# Patient Record
(demographics unavailable — no encounter records)

---

## 2025-03-12 NOTE — ASSESSMENT
[FreeTextEntry1] : Xray obtained RIGHT 3 views: reveal no fracture dislocation plantar heel spurring and posterior heel spurring. dorsal osteophyte at first metatarsal  Discussed diagnosis and treatment with patient  Discussed etiology of symptoms patient is experiencing  Demonstrated proper stretching techniques with patient showing understanding  Discussed proper RICE techniques to reduce inflammation and for pain control  Discussed specific examples of over-the-counter inserts to control heel eversion and support the medial arch Discussed proper shoes (stiff heel counter and midsole with flexion at the 1st MTPJ)  Rx:  Meloxicam PO BID for 2 weeks, then as needed Discussed all risks, complications, and benefits of corticosteroid injection therapy.   Will have further discussion at next visit Skin prepped with alcohol and corticosteroid injection Right  heel with 1 cc of 0.5% Marcaine plain and 4 mg Dexamethasone Phosphate. l1cc celestone.   Patient tolerated procedure well and a dry bandage applied to Right foot  Patient to return to the office in 3-4 weeks

## 2025-03-12 NOTE — PHYSICAL EXAM
[General Appearance - Alert] : alert [General Appearance - In No Acute Distress] : in no acute distress [Ankle Swelling (On Exam)] : not present [Varicose Veins Of Lower Extremities] : not present [] : not present [Delayed in the Right Toes] : capillary refills normal in right toes [Delayed in the Left Toes] : capillary refills normal in the left toes [2+] : left foot dorsalis pedis 2+ [de-identified] : Pain on palpation at medial calcaneal tuberosity of Right   Increased pain with the windless mechanism.  Flexible mid/hindfoot deformity noted foot passively corrected to plantigrade foot position Right.  Decreased range of motion in dorsiflexion Right Left.Standing Exam: Decrease in medial longitudinal arch Right and Left.Hindfoot valgus noted Right and Left.  Forefoot abduction 'too many toes' sign Right and Left Muscle strength 5/5 all major muscle groups bilateral. [Skin Color & Pigmentation] : normal skin color and pigmentation [Sensation] : the sensory exam was normal to light touch and pinprick [Vibration Dec.] : normal vibratory sensation at the level of the toes [Oriented To Time, Place, And Person] : oriented to person, place, and time [Impaired Insight] : insight and judgment were intact

## 2025-03-12 NOTE — HISTORY OF PRESENT ILLNESS
[FreeTextEntry1] : 48 year old male presents for initial visit for right heel pain x 3 weeks. Denies any inciting incident. Admits pain at first step in morning which gets better progressively throughout the day. States pain is 8/10. Has not seen provider for this. Denies any additional complaints.

## 2025-03-26 NOTE — HISTORY OF PRESENT ILLNESS
[FreeTextEntry1] : 48 year old male presents for follow up on right heel pain. States he has slight improvement from last visit but still painful. Rates the pain as 5/10 currently. patient is doing stretching exercises at home. Patient did not finish course of Meloxicam, took 6 pills.  Received injection last visit. states it did help Most pain is first step in morning

## 2025-03-26 NOTE — ASSESSMENT
[FreeTextEntry1] : Xray obtained RIGHT previously 3 views: reveal no fracture dislocation plantar heel spurring and posterior heel spurring. dorsal osteophyte at first metatarsal  Discussed diagnosis and treatment with patient  Discussed etiology of symptoms patient is experiencing Continue  Demonstrated proper stretching techniques with patient showing understanding  Discussed proper RICE techniques to reduce inflammation and for pain control  Discussed specific examples of over-the-counter inserts to control heel eversion and support the medial arch Discussed proper shoes (stiff heel counter and midsole with flexion at the 1st MTPJ) - [employer]   The [employer] Shape 3D laser foot scanner was placed in an upright position and the height of the patients chair was adjusted so that the patients feet would be in proper alignment perpendicular to the scanner. The right foot was placed upon the scanner and held in neutral position with moderate pressure of the foot against the scanner with the plantar aspect of the foot against the scanner. Orientation of the foot to the scanner was lined up with the central line of the scanner. The foot pedal was depressed to initiate the first scan. The integrity of the image scanned was checked and the final scanner file was written to the hard drive. The same procedure was performed on the left foot. The scanned images were then forwarded to the orthotic lab via E-mail to fabricate supportive accommodative UCB-type orthotics to go into a variety of shoes. Continue Meloxicam PO QD with food risks/benefits discussed Discussed all risks, complications, and benefits of corticosteroid injection therapy.  s/p 1 injection last visit offered additional defer Discussed benefits physical therapy - defer Dispensed nightsplint to utilize  He is to follow up in 4-6 weeks if symptomatic advised to call if any questions

## 2025-03-26 NOTE — PHYSICAL EXAM
[General Appearance - Alert] : alert [General Appearance - In No Acute Distress] : in no acute distress [Ankle Swelling (On Exam)] : not present [Varicose Veins Of Lower Extremities] : not present [] : not present [Delayed in the Right Toes] : capillary refills normal in right toes [Delayed in the Left Toes] : capillary refills normal in the left toes [2+] : left foot dorsalis pedis 2+ [de-identified] : Pain on palpation at medial calcaneal tuberosity of Right foot improved from prior encounter but still present.   Increased pain with the windless mechanism.  Flexible mid/hindfoot deformity noted foot passively corrected to plantigrade foot position Right.  Decreased range of motion in dorsiflexion Right Left.Standing Exam: Decrease in medial longitudinal arch Right and Left.  Right and Left Muscle strength 5/5 all major muscle groups bilateral. [Sensation] : the sensory exam was normal to light touch and pinprick [Vibration Dec.] : normal vibratory sensation at the level of the toes [Oriented To Time, Place, And Person] : oriented to person, place, and time [Impaired Insight] : insight and judgment were intact